# Patient Record
Sex: FEMALE | Race: WHITE | NOT HISPANIC OR LATINO | Employment: FULL TIME | URBAN - NONMETROPOLITAN AREA
[De-identification: names, ages, dates, MRNs, and addresses within clinical notes are randomized per-mention and may not be internally consistent; named-entity substitution may affect disease eponyms.]

---

## 2017-04-04 ENCOUNTER — OFFICE VISIT (OUTPATIENT)
Dept: NEUROLOGY | Facility: CLINIC | Age: 47
End: 2017-04-04

## 2017-04-04 VITALS
DIASTOLIC BLOOD PRESSURE: 78 MMHG | BODY MASS INDEX: 36.91 KG/M2 | HEART RATE: 82 BPM | SYSTOLIC BLOOD PRESSURE: 124 MMHG | WEIGHT: 188 LBS | OXYGEN SATURATION: 98 % | HEIGHT: 60 IN

## 2017-04-04 DIAGNOSIS — E08.00 DIABETES MELLITUS DUE TO UNDERLYING CONDITION WITH HYPEROSMOLARITY WITHOUT COMA, WITHOUT LONG-TERM CURRENT USE OF INSULIN (HCC): ICD-10-CM

## 2017-04-04 DIAGNOSIS — G71.29 CENTRAL CORE MYOPATHY (HCC): Primary | ICD-10-CM

## 2017-04-04 PROCEDURE — 99243 OFF/OP CNSLTJ NEW/EST LOW 30: CPT | Performed by: PSYCHIATRY & NEUROLOGY

## 2017-04-04 RX ORDER — NAPROXEN SODIUM 220 MG
220 TABLET ORAL 2 TIMES DAILY PRN
COMMUNITY

## 2017-04-04 RX ORDER — MELOXICAM 15 MG/1
15 TABLET ORAL DAILY
COMMUNITY
End: 2017-06-09 | Stop reason: ALTCHOICE

## 2017-04-04 RX ORDER — CETIRIZINE HYDROCHLORIDE 10 MG/1
10 TABLET ORAL DAILY
COMMUNITY

## 2017-04-04 RX ORDER — ATORVASTATIN CALCIUM 20 MG/1
20 TABLET, FILM COATED ORAL DAILY
COMMUNITY

## 2017-04-04 RX ORDER — IBUPROFEN 600 MG/1
600 TABLET ORAL EVERY 8 HOURS PRN
COMMUNITY

## 2017-04-04 RX ORDER — ASPIRIN 81 MG/1
81 TABLET ORAL DAILY
COMMUNITY

## 2017-04-04 NOTE — PROGRESS NOTES
[facility] NEUROLOGY [place] CONSULTATION   History of Present Illness     Date: 4/4/2017    Patient Identification  Angeline Platt is a 46 y.o. female.    Patient information was obtained from patient.  History/Exam limitations: none.    CONSULTATION requested by: Dr. Kessler,     Chief Complaint   Neurologic Problem (NP, Pt here to be seen for Congenital Myopathies and Central Core disease. Pt c/o getting weaker and in pain)      Neurologic Problem   The patient's primary symptoms include weakness. The patient's pertinent negatives include no syncope. This is a chronic problem. Episode onset: since childhood. The neurological problem developed gradually. The problem has been gradually worsening since onset. There was upper extremity and lower extremity focality noted. Associated symptoms include fatigue. Pertinent negatives include no abdominal pain, auditory change, aura, back pain, bladder incontinence, bowel incontinence, chest pain, confusion, diaphoresis, dizziness, fever, headaches, light-headedness, nausea, neck pain, palpitations, shortness of breath, vertigo or vomiting. Past treatments include nothing.      Patient also had significant family history of central cord disease including her sister and their father and patient reported that she was unable to keep up with her peer at physical education as a child she is having progressive difficulty climbing stairs not even one flight of stairs.  Patient has been diagnosis of fibromyalgia.  Patient also complaining having hip pain.    PMH: No past medical history on file.    Past Surgical History: No past surgical history on file.    Family Hisotry: No family history on file.    Social History:   Social History     Social History   • Marital status:      Spouse name: N/A   • Number of children: N/A   • Years of education: N/A     Occupational History   • Not on file.     Social History Main Topics   • Smoking status: Not on file   •  Smokeless tobacco: Not on file   • Alcohol use Not on file   • Drug use: Not on file   • Sexual activity: Not on file     Other Topics Concern   • Not on file     Social History Narrative       Medications:   Current Outpatient Prescriptions   Medication Sig Dispense Refill   • aspirin 81 MG EC tablet Take 81 mg by mouth Daily.     • atorvastatin (LIPITOR) 20 MG tablet Take 20 mg by mouth Daily.     • cetirizine (zyrTEC) 10 MG tablet Take 10 mg by mouth Daily.     • fluticasone (VERAMYST) 27.5 MCG/SPRAY nasal spray 2 sprays into each nostril Daily.     • ibuprofen (ADVIL,MOTRIN) 600 MG tablet Take 600 mg by mouth Every 8 (Eight) Hours As Needed for Mild Pain (1-3).     • meloxicam (MOBIC) 15 MG tablet Take 15 mg by mouth Daily.     • metFORMIN (GLUCOPHAGE) 850 MG tablet Take 850 mg by mouth 2 (Two) Times a Day With Meals.     • naproxen sodium (ALEVE) 220 MG tablet Take 220 mg by mouth 2 (Two) Times a Day As Needed for Mild Pain (1-3).       No current facility-administered medications for this visit.        Allergy: No Known Allergies    Review of Systems:  Review of Systems   Constitutional: Positive for fatigue. Negative for chills, diaphoresis and fever.   HENT: Negative for congestion, ear pain, hearing loss, rhinorrhea and sore throat.    Eyes: Negative for pain, discharge and redness.   Respiratory: Negative for cough, shortness of breath, wheezing and stridor.    Cardiovascular: Negative for chest pain, palpitations and leg swelling.   Gastrointestinal: Negative for abdominal pain, bowel incontinence, constipation, nausea and vomiting.   Endocrine: Negative for cold intolerance, heat intolerance and polyphagia.   Genitourinary: Negative for bladder incontinence, dysuria, flank pain, frequency and urgency.   Musculoskeletal: Negative for back pain, joint swelling, myalgias, neck pain and neck stiffness.   Skin: Negative for pallor, rash and wound.   Allergic/Immunologic: Negative for environmental allergies.  "  Neurological: Positive for weakness. Negative for dizziness, vertigo, tremors, seizures, syncope, facial asymmetry, speech difficulty, light-headedness, numbness and headaches.   Hematological: Negative for adenopathy.   Psychiatric/Behavioral: Negative for confusion and hallucinations. The patient is not nervous/anxious.        Physical Exam     Vitals:    04/04/17 1042   BP: 124/78   Pulse: 82   SpO2: 98%   Weight: 188 lb (85.3 kg)   Height: 60\" (152.4 cm)     GENERAL: Patient is pleasant, cooperative, appears to be stated age.  Body habitus is endomorphic.  SKIN AND EXTREMITIES:  No skin rashes or lesions are noted.  No cyanosis, clubbing or edema of the extremities.    HEAD:  Head is normocephalic and atraumatic.    NECK: Neck are non-tender without thyromegaly or adenopathy.  Carotic upstrokes are 1+/4.  No cranial or cervical bruits.  The neck is supple with a full range of motion.   ENT: palate elevate symmetrically, no evidence of high arch palate, tongue midline erythema in posterior pharynx, Mallampati Classification Class III   CARDIOVASCULAR:  Regular rate and rhythm with normal S1 and S2 without rub or gallop.  RESPIRATORY:  Clear to auscultation without wheezes or crackle   ABDOMEN:  Soft and non-tender, positive bowel sound without hepatosplenomegaly  BACK:  Back is straight without midline defect.    PSYCH:  Higher cortical function/mental status:  The patient is alert.  She is oriented x3 to time, place and person.  Recent and the remote memory appear normal.  The patient has a good fund of knowledge.  There is no visual or auditory hallucination or suicidal or homicidal ideation.  SPEECH:There is no gross evidence of aphasia, dysarthria or agnosia.      CRANIAL NERVES:  Pupils are 4mm, equal round reactive to light, reacting briskly to 2mm without afferent pupillary defect.  Visual fields are intact to confrontation testing.  Fundoscopic examination reveals sharp disk margins with normal " vasculature.  No papilledema, hemorrhages or exudates.  Extraocular movements are full and smooth with normal pursuits and saccades.  No nystagmus noted.  The face is symmetric. palate elevate symmetrically, Tongue midline, positive gag reflex. The remainder of the cranial nerves are intact and symmetrical.    MOTOR: Strength is 4/5 throughout with normal tone and bulk with the following exceptions, 4/5 intrinsic muscles of the hands and feet.  No involuntary movements noted.    Deep Tendon Reflexes: are 2/4 and symmetrical in the upper extremities, 2/4 and symmetrical at the knees and 1/4 and symmetrical at the Achilles tendon.  Plantar responses were down-going bilaterally.    SENSATION:  Intact to pinprick, light touch, vibration and proprioception.  Coordination:  The patient normally performs finger-nose-finger, heel-to-knee-to-shin and rapid alternating movements in symmetrical fashion.    COORDINATION AND GAIT:  The patient walks with a narrow-based gait.  Patient is able to heel-toe and tandem walk forward and backwards without difficulty.  Romberg and monopedal  Romberg are negative.    MUSCULOSKELETAL: when instructed patient to sit on the floor patient have to get off the floor through Gowers maneuver  High arch pes cavus is noted         Studies: I have personally reviewed the following and discussed with the patient.  No results found for this or any previous visit.    Review of Imaging: I have personally reviewed the following images and discussed with the patient.  No results found.      Records Reviewed: I have personally reviewed her previous medical record.    Angeline was seen today for neurologic problem.    Diagnoses and all orders for this visit:    Central core myopathy    Diabetes mellitus due to underlying condition with hyperosmolarity without coma, without long-term current use of insulin        Treatments:  1. 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  Onset is notable for being floppy shortly after birth and had difficulty in obtaining motor milestone.  In a number of patient patient also have congenital hip dislocation.  Patient also noted to have difficulty in keeping up with therapy or during physical education such as running or jumping.    This patient tend to have short stature but there is no focal muscle atrophy noted.      Their weakness primarily in the proximal distribution particularly in the hip neck weakness is usually mild deep tendon reflex often surprisingly normal.  Congenital hip dislocation or lordosis and kyphoscoliosis also seen in these patients. Patient also have passed cavus or tight heel cords.    EMG finding is rather nonspecific however muscle biopsy is quite strikingly abnormal due to type I fiber predominance and there is excess today in the Center most of the fiber an area which is unreactive with oxidative enzyme histochemistry.    Malignant hyperthermia has been reported in patient undergo surgery under general anesthesia.    We have discussed with the patient about using creatinine supplement    We have also discusse the use of Thiazolidinediones such as Avandia and actos to increase glucose utilization and conversion to glycogen in muscle in DM patient    This Document is signed by Maximo Paez MD, FAAN, FAASM April 4, 20177:16 PM

## 2017-04-17 ENCOUNTER — OFFICE VISIT (OUTPATIENT)
Dept: NEUROLOGY | Facility: CLINIC | Age: 47
End: 2017-04-17

## 2017-04-17 VITALS
OXYGEN SATURATION: 98 % | WEIGHT: 188 LBS | SYSTOLIC BLOOD PRESSURE: 130 MMHG | HEIGHT: 60 IN | HEART RATE: 76 BPM | BODY MASS INDEX: 36.91 KG/M2 | DIASTOLIC BLOOD PRESSURE: 84 MMHG

## 2017-04-17 DIAGNOSIS — G71.29 CENTRAL CORE MYOPATHY (HCC): Primary | ICD-10-CM

## 2017-04-17 PROCEDURE — 99214 OFFICE O/P EST MOD 30 MIN: CPT | Performed by: PSYCHIATRY & NEUROLOGY

## 2017-04-18 NOTE — PROGRESS NOTES
[facility] NEUROLOGY [place] PROGRESS NOTE  History of Present Illness     Date: 4/17/2017    Patient Identification  Angeline Platt is a 46 y.o. female.    Patient information was obtained from patient.  History/Exam limitations: none.    Original consultation requested by: Luciano Kessler DO      Chief Complaint   Neurologic Problem (CENTRAL CORE MYOPATHY F/U. pt states that she did start creatine but she has been sick since her last visit and cannot tell if it is helping at this point.)      History of Present Illness   Patient is a delightful 46-year-old American family history of central cord myopathy.  Patient was just started on creatine and patient reported that she is getting stronger after she was started on this supplement.  He initially took to scoop of supplement a day and she developed diarrhea and not tissue back up to 1 scoop a day and she is doing well.    In today visit we have mapped out her family history of central core myopathy and we have also get on the film interviewing her sister who has also been diagnosed of central core myopathy and 2 of her children also been diagnosed with central core myopathy.    PMH: No past medical history on file.    Past Surgical History: No past surgical history on file.    Family Hisotry: No family history on file.    Social History:   Social History     Social History   • Marital status:      Spouse name: N/A   • Number of children: N/A   • Years of education: N/A     Occupational History   • Not on file.     Social History Main Topics   • Smoking status: Not on file   • Smokeless tobacco: Not on file   • Alcohol use Not on file   • Drug use: Not on file   • Sexual activity: Not on file     Other Topics Concern   • Not on file     Social History Narrative       Medications:   Current Outpatient Prescriptions   Medication Sig Dispense Refill   • aspirin 81 MG EC tablet Take 81 mg by mouth Daily.     • atorvastatin (LIPITOR) 20 MG tablet  Take 20 mg by mouth Daily.     • cetirizine (zyrTEC) 10 MG tablet Take 10 mg by mouth Daily.     • fluticasone (VERAMYST) 27.5 MCG/SPRAY nasal spray 2 sprays into each nostril Daily.     • ibuprofen (ADVIL,MOTRIN) 600 MG tablet Take 600 mg by mouth Every 8 (Eight) Hours As Needed for Mild Pain (1-3).     • meloxicam (MOBIC) 15 MG tablet Take 15 mg by mouth Daily.     • metFORMIN (GLUCOPHAGE) 850 MG tablet Take 850 mg by mouth 2 (Two) Times a Day With Meals.     • naproxen sodium (ALEVE) 220 MG tablet Take 220 mg by mouth 2 (Two) Times a Day As Needed for Mild Pain (1-3).       No current facility-administered medications for this visit.        Allergy: No Known Allergies    Review of Systems:  Review of Systems   Constitutional: Positive for fatigue. Negative for chills and fever.   HENT: Negative for congestion, ear pain, hearing loss, rhinorrhea and sore throat.    Eyes: Negative for pain, discharge and redness.   Respiratory: Negative for cough, shortness of breath, wheezing and stridor.    Cardiovascular: Negative for chest pain, palpitations and leg swelling.   Gastrointestinal: Negative for abdominal pain, constipation, nausea and vomiting.   Endocrine: Negative for cold intolerance, heat intolerance and polyphagia.   Genitourinary: Negative for dysuria, flank pain, frequency and urgency.   Musculoskeletal: Positive for gait problem. Negative for joint swelling, myalgias, neck pain and neck stiffness.   Skin: Negative for pallor, rash and wound.   Allergic/Immunologic: Negative for environmental allergies.   Neurological: Positive for weakness. Negative for dizziness, tremors, seizures, syncope, facial asymmetry, speech difficulty, light-headedness, numbness and headaches.   Hematological: Negative for adenopathy.   Psychiatric/Behavioral: Negative for confusion and hallucinations. The patient is not nervous/anxious.        Physical Exam     Vitals:    04/17/17 1550   BP: 130/84   Pulse: 76   SpO2: 98%  "  Weight: 188 lb (85.3 kg)   Height: 60\" (152.4 cm)     GENERAL: Patient is pleasant, cooperative, appears to be stated age.  Body habitus is endomorphic.  SKIN AND EXTREMITIES:  No skin rashes or lesions are noted.  No cyanosis, clubbing or edema of the extremities.    HEAD:  Head is normocephalic and atraumatic.    NECK: Neck are non-tender without thyromegaly or adenopathy.  Carotic upstrokes are 1+/4.  No cranial or cervical bruits.  The neck is supple with a full range of motion.   ENT: palate elevate symmetrically, no evidence of high arch palate, tongue midline erythema in posterior pharynx, Mallampati Classification Class III   CARDIOVASCULAR:  Regular rate and rhythm with normal S1 and S2 without rub or gallop.  RESPIRATORY:  Clear to auscultation without wheezes or crackle   ABDOMEN:  Soft and non-tender, positive bowel sound without hepatosplenomegaly  BACK:  Back is straight without midline defect.    PSYCH:  Higher cortical function/mental status:  The patient is alert.  She is oriented x3 to time, place and person.  Recent and the remote memory appear normal.  The patient has a good fund of knowledge.  There is no visual or auditory hallucination or suicidal or homicidal ideation.  SPEECH:There is no gross evidence of aphasia, dysarthria or agnosia.      CRANIAL NERVES:  Pupils are 4mm, equal round reactive to light, reacting briskly to 2mm without afferent pupillary defect.  Visual fields are intact to confrontation testing.  Fundoscopic examination reveals sharp disk margins with normal vasculature.  No papilledema, hemorrhages or exudates.  Extraocular movements are full and smooth with normal pursuits and saccades.  No nystagmus noted.  The face is symmetric. palate elevate symmetrically, Tongue midline, positive gag reflex. The remainder of the cranial nerves are intact and symmetrical.    MOTOR: Strength is 5/5 throughout with normal tone and bulk with the following exceptions, 4/5 intrinsic " muscles of the hands and feet.  No involuntary movements noted.    Deep Tendon Reflexes: are 2/4 and symmetrical in the upper extremities, 2/4 and symmetrical at the knees and 1/4 and symmetrical at the Achilles tendon.  Plantar responses were down-going bilaterally.    SENSATION:  Intact to pinprick, light touch, vibration and proprioception.  Coordination:  The patient normally performs finger-nose-finger, heel-to-knee-to-shin and rapid alternating movements in symmetrical fashion.    COORDINATION AND GAIT:  The patient walks with a narrow-based gait.  Patient is able to heel-toe and tandem walk forward and backwards without difficulty.  Romberg and monopedal  Romberg are negative.    MUSCULOSKELETAL: Range of motion normal, no clubbing, cyanosis, or edema.  No joint swelling.            Records Reviewed: I have personally reviewed her previous medical record.    Angeline was seen today for neurologic problem.    Diagnoses and all orders for this visit:    Central core myopathy      Treatments:  1. We have discussed extensively today with the patient on her family history of central core myopathy.  2.  We have mapped out to genealogy for central core myopathy  3.  We have discussed about the use of creatine supplement for her central core myopathy  4. .  Will examine her sister who has been diagnosed genetic mutation and muscle biopsy by Dr. Nathanael Oneal and also Dr. Hola Gardiner at [facility]    This Document is signed by Maximo Paez MD, FAAN, FAASM   April 17, 20178:18 PM

## 2017-06-09 ENCOUNTER — OFFICE VISIT (OUTPATIENT)
Dept: NEUROLOGY | Facility: CLINIC | Age: 47
End: 2017-06-09

## 2017-06-09 VITALS
OXYGEN SATURATION: 98 % | BODY MASS INDEX: 37.3 KG/M2 | WEIGHT: 190 LBS | HEIGHT: 60 IN | HEART RATE: 93 BPM | DIASTOLIC BLOOD PRESSURE: 90 MMHG | SYSTOLIC BLOOD PRESSURE: 136 MMHG

## 2017-06-09 DIAGNOSIS — M79.18 MYOFASCIAL MUSCLE PAIN: ICD-10-CM

## 2017-06-09 DIAGNOSIS — R29.6 FALLS FREQUENTLY: ICD-10-CM

## 2017-06-09 DIAGNOSIS — G71.29 CENTRAL CORE MYOPATHY (HCC): Primary | ICD-10-CM

## 2017-06-09 PROCEDURE — 99214 OFFICE O/P EST MOD 30 MIN: CPT | Performed by: PSYCHIATRY & NEUROLOGY

## 2017-06-09 RX ORDER — METHYLPREDNISOLONE 4 MG/1
TABLET ORAL
Qty: 1 EACH | Refills: 0 | Status: SHIPPED | OUTPATIENT
Start: 2017-06-09

## 2017-06-09 NOTE — PROGRESS NOTES
"[facility] NEUROLOGY [place] PROGRESS NOTE  History of Present Illness     Date: 6/9/2017    Patient Identification  Angeline Platt is a 46 y.o. female.    Patient information was obtained from patient.  History/Exam limitations: none.    Original consultation requested by: TOLU Rothman      Chief Complaint   Myopathy (Follow up central core myopathy, pt states sx are \"about the same\" )      History of Present Illness   Patient is a delightful 46-year-old with diagnosis of central core myopathy patient has been clinically stable, even though she has fallen a few times since last visit.  Patient reports to have muscle pain despite using ibuprofen and opiate.  She is requesting for some other medication to help with the muscle pain.    PMH: No past medical history on file.    Past Surgical History: No past surgical history on file.    Family Hisotry: No family history on file.    Social History:   Social History     Social History   • Marital status:      Spouse name: N/A   • Number of children: N/A   • Years of education: N/A     Occupational History   • Not on file.     Social History Main Topics   • Smoking status: Not on file   • Smokeless tobacco: Not on file   • Alcohol use Not on file   • Drug use: Not on file   • Sexual activity: Not on file     Other Topics Concern   • Not on file     Social History Narrative       Medications:   Current Outpatient Prescriptions   Medication Sig Dispense Refill   • aspirin 81 MG EC tablet Take 81 mg by mouth Daily.     • atorvastatin (LIPITOR) 20 MG tablet Take 20 mg by mouth Daily.     • cetirizine (zyrTEC) 10 MG tablet Take 10 mg by mouth Daily.     • fluticasone (VERAMYST) 27.5 MCG/SPRAY nasal spray 2 sprays into each nostril Daily.     • ibuprofen (ADVIL,MOTRIN) 600 MG tablet Take 600 mg by mouth Every 8 (Eight) Hours As Needed for Mild Pain (1-3).     • metFORMIN (GLUCOPHAGE) 850 MG tablet Take 850 mg by mouth 2 (Two) Times a Day With " "Meals.     • naproxen sodium (ALEVE) 220 MG tablet Take 220 mg by mouth 2 (Two) Times a Day As Needed for Mild Pain (1-3).     • MethylPREDNISolone (MEDROL, KENDALL,) 4 MG tablet Take as directed on package instructions. 1 each 0     No current facility-administered medications for this visit.        Allergy: No Known Allergies    Review of Systems:  Review of Systems   Constitutional: Positive for fatigue. Negative for chills and fever.   HENT: Negative for congestion, ear pain, hearing loss, rhinorrhea and sore throat.    Eyes: Negative for pain, discharge and redness.   Respiratory: Negative for cough, shortness of breath, wheezing and stridor.    Cardiovascular: Negative for chest pain, palpitations and leg swelling.   Gastrointestinal: Negative for abdominal pain, constipation, nausea and vomiting.   Endocrine: Negative for cold intolerance, heat intolerance and polyphagia.   Genitourinary: Negative for dysuria, flank pain, frequency and urgency.   Musculoskeletal: Positive for back pain, gait problem and myalgias. Negative for joint swelling, neck pain and neck stiffness.   Skin: Negative for pallor, rash and wound.   Allergic/Immunologic: Negative for environmental allergies.   Neurological: Positive for weakness. Negative for dizziness, tremors, seizures, syncope, facial asymmetry, speech difficulty, light-headedness, numbness and headaches.   Hematological: Negative for adenopathy.   Psychiatric/Behavioral: Negative for confusion and hallucinations. The patient is not nervous/anxious.        Physical Exam     Vitals:    06/09/17 1411   BP: 136/90   Pulse: 93   SpO2: 98%   Weight: 190 lb (86.2 kg)   Height: 60\" (152.4 cm)     GENERAL: Patient is pleasant, cooperative, appears to be stated age.  Body habitus is endomorphic.  SKIN AND EXTREMITIES:  No skin rashes or lesions are noted.  No cyanosis, clubbing or edema of the extremities.    HEAD:  Head is normocephalic and atraumatic.    NECK: Neck are non-tender " without thyromegaly or adenopathy.  Carotic upstrokes are 1+/4.  No cranial or cervical bruits.  The neck is supple with a full range of motion.   ENT: palate elevate symmetrically, no evidence of high arch palate, tongue midline erythema in posterior pharynx, Mallampati Classification Class III   CARDIOVASCULAR:  Regular rate and rhythm with normal S1 and S2 without rub or gallop.  RESPIRATORY:  Clear to auscultation without wheezes or crackle   ABDOMEN:  Soft and non-tender, positive bowel sound without hepatosplenomegaly  BACK:  Back is straight without midline defect.    PSYCH:  Higher cortical function/mental status:  The patient is alert.  She is oriented x3 to time, place and person.  Recent and the remote memory appear normal.  The patient has a good fund of knowledge.  There is no visual or auditory hallucination or suicidal or homicidal ideation.  SPEECH:There is no gross evidence of aphasia, dysarthria or agnosia.      CRANIAL NERVES:  Pupils are 4mm, equal round reactive to light, reacting briskly to 2mm without afferent pupillary defect.  Visual fields are intact to confrontation testing.  Fundoscopic examination reveals sharp disk margins with normal vasculature.  No papilledema, hemorrhages or exudates.  Extraocular movements are full and smooth with normal pursuits and saccades.  No nystagmus noted.  The face is symmetric. palate elevate symmetrically, Tongue midline, positive gag reflex. The remainder of the cranial nerves are intact and symmetrical.    MOTOR: Strength is 4/5 throughout with normal tone and bulk with the following exceptions, 4/5 intrinsic muscles of the hands and feet.  No involuntary movements noted.    Deep Tendon Reflexes: are 2/4 and symmetrical in the upper extremities, 2/4 and symmetrical at the knees and 1/4 and symmetrical at the Achilles tendon.  Plantar responses were down-going bilaterally.    SENSATION:  Intact to pinprick, light touch, vibration and proprioception.   Coordination:  The patient normally performs finger-nose-finger, heel-to-knee-to-shin and rapid alternating movements in symmetrical fashion.    COORDINATION AND GAIT:  The patient walks with a narrow-based gait.  Patient is able to heel-toe and tandem walk forward and backwards without difficulty.  Romberg and monopedal  Romberg are negative.    MUSCULOSKELETAL: Range of motion normal, no clubbing, cyanosis, or edema.  No joint swelling.            Records Reviewed: I have personally reviewed her previous medical record.    Angeline was seen today for myopathy.    Diagnoses and all orders for this visit:    Central core myopathy    Falls frequently    Myofascial muscle pain    Other orders  -     MethylPREDNISolone (MEDROL, KENDALL,) 4 MG tablet; Take as directed on package instructions.        Treatments:  1. Patient is complaining of soreness in her muscle off the suffer from a fall and she had been taking ibuprofen along with opiate pain medication without improvement  2. I would like to provide patient with a Medrol Dosepak taper over 1 week  3. Explained to the patient is side effect profile of prednisone including increasing her blood sugar    This Document is signed by Maximo Paez MD, FAAN, FAASM   June 9, 20175:05 PM

## 2021-08-25 ENCOUNTER — HOSPITAL ENCOUNTER (OUTPATIENT)
Dept: HOSPITAL 79 - EMI | Age: 51
End: 2021-08-25
Attending: PSYCHIATRY & NEUROLOGY
Payer: COMMERCIAL

## 2021-08-25 DIAGNOSIS — M51.16: Primary | ICD-10-CM
